# Patient Record
Sex: FEMALE | Race: AMERICAN INDIAN OR ALASKA NATIVE
[De-identification: names, ages, dates, MRNs, and addresses within clinical notes are randomized per-mention and may not be internally consistent; named-entity substitution may affect disease eponyms.]

---

## 2017-05-30 NOTE — EDM.PDOC
ED HPI GENERAL MEDICAL PROBLEM





- General


Chief Complaint: Abdominal Pain


Stated Complaint: SIDE/ABD PAINS, [id number]


Time Seen by Provider: 05/30/17 19:00


Source of Information: Reports: Patient


History Limitations: Reports: No Limitations





- History of Present Illness


INITIAL COMMENTS - FREE TEXT/NARRATIVE: 





c/o RUQ pain x 2 days, vomiting yesterday, none today, nauseated. No previous 

symptoms. Denies fever. 


Duration: Day(s):


Location: Reports: Abdomen


Quality: Reports: Throbbing


Severity: Moderate


Associated Symptoms: Reports: Nausea/Vomiting


  ** Right Upper Abdomen


Pain Score (Numeric/FACES): 0





- Related Data


 Allergies











Allergy/AdvReac Type Severity Reaction Status Date / Time


 


No Known Allergies Allergy   Verified 05/30/17 18:23











Home Meds: 


 Home Meds





Ferrous Sulfate [Iron] 325 mg PO BID 05/12/17 [History]


Prenatal Vit W-Ca,Fe,FA(<1 mg) [Prenatal Vitamins] 1 each PO DAILY 05/12/17 [

History]











Past Medical History





- Past Health History


Medical/Surgical History: Denies Medical/Surgical History





Social & Family History





- Family History


Family Medical History: Noncontributory





- Tobacco Use


Smoking Status *Q: Current Every Day Smoker


Years of Tobacco use: 2


Packs/Tins Daily: 0.4


Used Tobacco, but Quit: No





- Caffeine Use


Caffeine Use: Reports: Coffee, Soda





- Recreational Drug Use


Recreational Drug Use: No





ED ROS GENERAL





- Review of Systems


Review Of Systems: See Below


Constitutional: Reports: Decreased Appetite


HEENT: Reports: No Symptoms


Respiratory: Reports: No Symptoms


Cardiovascular: Reports: No Symptoms


GI/Abdominal: Reports: Abdominal Pain, Nausea, Vomiting


: Reports: No Symptoms, Other (s/p vaginal devilery 2 weeks ago.)


Musculoskeletal: Reports: No Symptoms


Skin: Reports: No Symptoms


Neurological: Reports: No Symptoms


Psychiatric: Reports: No Symptoms





ED EXAM, GI/ABD





- Physical Exam


Exam: See Below


Exam Limited By: No Limitations


General Appearance: Alert, Mild Distress


Eyes: Bilateral: Normal Appearance


Ears: Normal External Exam


Nose: Normal Inspection


Throat/Mouth: Normal Inspection


Head: Atraumatic, Normocephalic


Neck: Normal Inspection


Respiratory/Chest: No Respiratory Distress, Lungs Clear, Normal Breath Sounds


Cardiovascular: Normal Peripheral Pulses, Regular Rate, Rhythm


GI/Abdominal: Normal Bowel Sounds, Soft, No Distention, Tenderness, Mcgovern's 

Sign.  No: Guarding, Rebound, Rigidity


Extremities: Normal Inspection


Neurological: Alert, Oriented


Psychiatric: Normal Affect


Skin Exam: Warm, Dry, Intact





Course





- Vital Signs


Last Recorded V/S: 


 Last Vital Signs











Temp  97.3 F   05/30/17 21:49


 


Pulse  60   05/30/17 21:49


 


Resp  14   05/30/17 21:49


 


BP  125/58 L  05/30/17 21:49


 


Pulse Ox  100   05/30/17 21:49














- Orders/Labs/Meds


Orders: 


 Active Orders 24 hr











 Category Date Time Status


 


 CULTURE BLOOD [BC] Stat Lab  05/30/17 19:20 Received











Labs: 


 Laboratory Tests











  05/30/17 05/30/17 05/30/17 Range/Units





  19:11 19:20 19:20 


 


WBC   12.8 H   (5.0-10.0)  10^3/uL


 


RBC   4.54   (4.2-5.4)  10^6/uL


 


Hgb   12.6   (12.0-16.0)  g/dL


 


Hct   39.1   (37.0-47.0)  %


 


MCV   86.1   ()  fL


 


MCH   27.8   (27.0-34.0)  pg


 


MCHC   32.2 L   (33.0-35.0)  g/dL


 


Plt Count   336   (150-450)  10^3/uL


 


Neut % (Auto)   77.7 H   (42.2-75.2)  %


 


Lymph % (Auto)   15.7 L   (20.5-50.1)  %


 


Mono % (Auto)   6.0   (2-8)  %


 


Eos % (Auto)   0.5 L   (1.0-3.0)  %


 


Baso % (Auto)   0.1   (0.0-1.0)  %


 


Sodium    140  (135-145)  mmol/L


 


Potassium    3.8  (3.6-5.0)  mmol/L


 


Chloride    104  (101-111)  mmol/L


 


Carbon Dioxide    26.0  (21.0-31.0)  mmol/L


 


Anion Gap    13.8  


 


BUN    14  (7-18)  mg/dL


 


Creatinine    0.8  (0.6-1.3)  mg/dL


 


Est Cr Clr Drug Dosing    79.85  mL/min


 


Estimated GFR (MDRD)    > 60  


 


BUN/Creatinine Ratio    17.50  


 


Glucose    90  ()  mg/dL


 


Lactic Acid     (0.5-2.2)  mmol/L


 


Calcium    9.4  (8.4-10.2)  mg/dl


 


Total Bilirubin    1.6 H  (0.2-1.0)  mg/dL


 


AST    185 H  (10-42)  IU/L


 


ALT    179 H  (10-60)  IU/L


 


Alkaline Phosphatase    127 H  ()  IU/L


 


Total Protein    7.7  (6.7-8.2)  g/dl


 


Albumin    4.0  (3.2-5.5)  g/dl


 


Globulin    3.7  


 


Albumin/Globulin Ratio    1.08  


 


Amylase    175 H  ()  U/L


 


Lipase    286 H  (22-51)  U/L


 


Urine Color  Dark yellow    (YELLOW)  


 


Urine Appearance  Slightly cloudy    (CLEAR)  


 


Urine pH  5.5    (5.0-9.0)  


 


Ur Specific Gravity  1.025    (1.005-1.030)  


 


Urine Protein  30 H    (NEGATIVE)  


 


Urine Glucose (UA)  Negative    (NEGATIVE)  


 


Urine Ketones  Trace H    (NEGATIVE)  


 


Urine Occult Blood  Moderate H    (NEGATIVE)  


 


Urine Nitrite  Negative    (NEGATIVE)  


 


Urine Bilirubin  Moderate H    (NEGATIVE)  


 


Urine Urobilinogen  2.0 H    (0.2-1.0)  mg/dL


 


Ur Leukocyte Esterase  Trace H    (NEGATIVE)  


 


Urine RBC  20-30 H    /HPF


 


Urine WBC  0-5    (0-5/HPF)  /HPF


 


Ur Epithelial Cells  Few    /HPF


 


Urine Bacteria  Moderate H    (0-FEW/HPF)  /HPF


 


Urine Mucus  Moderate H    /LPF














  05/30/17 Range/Units





  19:20 


 


WBC   (5.0-10.0)  10^3/uL


 


RBC   (4.2-5.4)  10^6/uL


 


Hgb   (12.0-16.0)  g/dL


 


Hct   (37.0-47.0)  %


 


MCV   ()  fL


 


MCH   (27.0-34.0)  pg


 


MCHC   (33.0-35.0)  g/dL


 


Plt Count   (150-450)  10^3/uL


 


Neut % (Auto)   (42.2-75.2)  %


 


Lymph % (Auto)   (20.5-50.1)  %


 


Mono % (Auto)   (2-8)  %


 


Eos % (Auto)   (1.0-3.0)  %


 


Baso % (Auto)   (0.0-1.0)  %


 


Sodium   (135-145)  mmol/L


 


Potassium   (3.6-5.0)  mmol/L


 


Chloride   (101-111)  mmol/L


 


Carbon Dioxide   (21.0-31.0)  mmol/L


 


Anion Gap   


 


BUN   (7-18)  mg/dL


 


Creatinine   (0.6-1.3)  mg/dL


 


Est Cr Clr Drug Dosing   mL/min


 


Estimated GFR (MDRD)   


 


BUN/Creatinine Ratio   


 


Glucose   ()  mg/dL


 


Lactic Acid  0.9  (0.5-2.2)  mmol/L


 


Calcium   (8.4-10.2)  mg/dl


 


Total Bilirubin   (0.2-1.0)  mg/dL


 


AST   (10-42)  IU/L


 


ALT   (10-60)  IU/L


 


Alkaline Phosphatase   ()  IU/L


 


Total Protein   (6.7-8.2)  g/dl


 


Albumin   (3.2-5.5)  g/dl


 


Globulin   


 


Albumin/Globulin Ratio   


 


Amylase   ()  U/L


 


Lipase   (22-51)  U/L


 


Urine Color   (YELLOW)  


 


Urine Appearance   (CLEAR)  


 


Urine pH   (5.0-9.0)  


 


Ur Specific Gravity   (1.005-1.030)  


 


Urine Protein   (NEGATIVE)  


 


Urine Glucose (UA)   (NEGATIVE)  


 


Urine Ketones   (NEGATIVE)  


 


Urine Occult Blood   (NEGATIVE)  


 


Urine Nitrite   (NEGATIVE)  


 


Urine Bilirubin   (NEGATIVE)  


 


Urine Urobilinogen   (0.2-1.0)  mg/dL


 


Ur Leukocyte Esterase   (NEGATIVE)  


 


Urine RBC   /HPF


 


Urine WBC   (0-5/HPF)  /HPF


 


Ur Epithelial Cells   /HPF


 


Urine Bacteria   (0-FEW/HPF)  /HPF


 


Urine Mucus   /LPF











Meds: 


Medications














Discontinued Medications














Generic Name Dose Route Start Last Admin





  Trade Name Inderjitq  PRN Reason Stop Dose Admin


 


Hydrocodone Bitart/Acetaminophen  Confirm  05/30/17 21:30  05/30/17 21:38





  Norco 325-10 Mg  Administered  05/30/17 21:31  Not Given





  Dose   





  2 tab   





  .ROUTE   





  .STK-MED ONE   


 


Hydromorphone HCl  1 mg  05/30/17 19:07  05/30/17 19:24





  Dilaudid  IVPUSH  05/30/17 19:08  1 mg





  ONETIME ONE   Administration


 


Sodium Chloride  1,000 mls @ 500 mls/hr  05/30/17 19:07  05/30/17 19:22





  Normal Saline  IV  05/30/17 21:06  500 mls/hr





  .BOLUS ONE   Administration


 


Iopamidol  75 ml  05/30/17 19:56  05/30/17 20:24





  Isovue-300 (61%)  IVPUSH  05/30/17 19:57  75 ml





  ONETIME ONE   Administration


 


Metoclopramide HCl  10 mg  05/30/17 20:57  05/30/17 21:00





  Reglan  IVPUSH  05/30/17 20:58  10 mg





  ONETIME ONE   Administration


 


Ondansetron HCl  4 mg  05/30/17 19:07  05/30/17 19:24





  Zofran  IV  05/30/17 19:08  4 mg





  ONETIME ONE   Administration


 


Ondansetron HCl  Confirm  05/30/17 21:30  05/30/17 21:38





  Zofran Odt  Administered  05/30/17 21:31  Not Given





  Dose   





  12 mg   





  .ROUTE   





  [facility]   














- Radiology Interpretation


Free Text/Narrative:: 





Cholecystitis - gall badder wall thickening no ductal dilatation











- Re-Assessments/Exams


Free Text/Narrative Re-Assessment/Exam: 





05/31/17 02:05


Pain improved following dilaudid. Patient and mother informed of CT results.  

Patient to follow with PCP this week. 





Departure





- Departure


Time of Disposition: 21:36


Disposition: Home, Self-Care 01


Condition: good


Clinical Impression: 


 Cholecystitis








- Discharge Information


Instructions:  Cholecystitis, Easy-to-Read


Forms:  ED Department Discharge


Additional Instructions: 


low fat low acid diet


follow up in clinic this week, sooner if develop fever or chills, worsening of 

pain


zofran 4mg ODT one every 6 hours as needed for nausea 


Hydrocodone 10/325 one every 6 hours as needed for pain #2








- My Orders


Last 24 Hours: 


My Active Orders





05/30/17 19:20


CULTURE BLOOD [BC] Stat 














- Assessment/Plan


Last 24 Hours: 


My Active Orders





05/30/17 19:20


CULTURE BLOOD [BC] Stat

## 2021-08-13 ENCOUNTER — HOSPITAL ENCOUNTER (OUTPATIENT)
Dept: HOSPITAL 43 - DL.SDS | Age: 31
Discharge: HOME | End: 2021-08-13
Attending: FAMILY MEDICINE
Payer: MEDICAID

## 2021-08-13 VITALS — DIASTOLIC BLOOD PRESSURE: 64 MMHG | HEART RATE: 60 BPM | SYSTOLIC BLOOD PRESSURE: 104 MMHG

## 2021-08-13 DIAGNOSIS — D50.9: ICD-10-CM

## 2021-08-13 DIAGNOSIS — O02.1: Primary | ICD-10-CM

## 2021-08-13 NOTE — OR
DATE:  2021

 

PROCEDURE PERFORMED:  Suction dilatation and curettage.

 

INDICATION FOR PROCEDURE:  Missed  with retained products of conception.

 

ANESTHESIA:  MAC with local.

 

BRIEF HISTORY:  30-year-old  2, para 1-0-1-1 with an ultrasound showing a

fetal pole and sac consistent with 10-week 3-day gestation.  The patient should

be 13 weeks' gestation today.  Denying active bleeding or cramping.  In the

office, we have discussed options for expectant medical or surgical management

and she has elected surgical management.  Reports that she is in her usual state

of health at this time.  Vomiting has been better and is controlled with

medications.  Denies signs or symptoms that she is starting to pass the tissue

yet.

 

CONSENT: Discussed indication for removal of contents of the uterus for

emotional closure.  Also, decreased risk of infection and bleeding.  Discussed

risk of bleeding to the point of requiring a blood transfusion, risk of

potential post or perioperative infection, risk of injury to internal organs and

adjacent structures including, but not limited to, bladder; uterus; intestines;

vagina; cervix; and how those would be managed.  Her questions were answered and

she agreed to proceed.  Appropriate consent forms were signed and in the chart.

 

DETAILS:  The patient brought to the operating room.  She had doxycycline 200 mg

p.o. 30 minutes prior to start time.  She was provided with MAC anesthesia and

placed in dorsal lithotomy position.  Vagina prepped and straight catheter

performed with return of 10 mL of clear fluid.  The patient had voided just

prior to coming to the OR.  Bimanual exam consistent with a 10-week uterine size

in slightly anteverted position.  Sterile speculum was then placed, 1 mL of 0.5%

lidocaine with epinephrine was used in the anterior lip for placement of

the single-tooth tenaculum. Then injected at the 4 and 8 o'clock

positions 4.5 to 5 mL each for the paracervical block.  Appropriate wheal was

created with speculum in place.  Uterus was sounded to 10.5 mL, and Hegar

dilators used up into the 20 size without difficulty.  Suction catheter then

placed and with typical suction technique with rotation and sweeps and scrapes

along the uterus.  Contents of the uterus aspirated without incident.  Uterus

was then cleared with sharp curette and the uterine cri was well felt.  Final

pass of the suction catheter was made  it was felt that all contents had been

removed at that time.  Bedside ultrasound was performed by myself and poor

quality image.  However, it was felt that the uterus was clear.  I inspected the

tissue received.  I initially did not see the baby, so repeated ultrasound with

some changes in settings and uterus still appeared empty.  Bimanual exam

revealed the uterus to be back down to about a 6-week size, well contracted.

Contents inspected again, and at this time I was finding fetal parts in the 
aspiration

contents.

 

ESTIMATED BLOOD LOSS:  200 mL.

 

FINDINGS:  Gross inspection of the products of conception revealed placental

tissue, gestational sac, and fetal parts consistent with 10-week gestation.

Pathology was not requested.

 

COMPLICATIONS:  None.

 

MEDICATIONS:  The patient had an IV running with potassium chloride.  She was

also given a dose of  10 units IM Pitocin and 800 mcg of rectal Cytotec.

Bleeding was well controlled at the end of the procedure.

 

DISPOSITION:  The patient taken to the PACU for further recovery and her

boyfriend's mother informed of the outcome of the procedure.

 

DD:  2021 11:41:33

DT:  2021 12:47:47

[facility]

Job #:  [id number]

KRISTI

## 2022-01-05 ENCOUNTER — HOSPITAL ENCOUNTER (EMERGENCY)
Dept: HOSPITAL 43 - DL.ED | Age: 32
Discharge: HOME | End: 2022-01-05
Payer: MEDICAID

## 2022-01-05 VITALS — SYSTOLIC BLOOD PRESSURE: 121 MMHG | HEART RATE: 120 BPM | DIASTOLIC BLOOD PRESSURE: 80 MMHG

## 2022-01-05 DIAGNOSIS — Z20.822: ICD-10-CM

## 2022-01-05 DIAGNOSIS — Z91.048: ICD-10-CM

## 2022-01-05 DIAGNOSIS — N18.9: ICD-10-CM

## 2022-01-05 DIAGNOSIS — J10.1: Primary | ICD-10-CM

## 2022-01-05 DIAGNOSIS — Z72.0: ICD-10-CM

## 2022-01-05 LAB — SARS-COV-2 RNA RESP QL NAA+PROBE: NEGATIVE

## 2022-01-05 PROCEDURE — 0240U: CPT

## 2022-01-05 PROCEDURE — 99283 EMERGENCY DEPT VISIT LOW MDM: CPT

## 2022-01-05 NOTE — EDM.PDOC
ED HPI GENERAL MEDICAL PROBLEM





- General


Chief Complaint: ENT Problem


Stated Complaint: EAR ACHE, HEAD ACHE, CHEST, COUGH


Time Seen by Provider: 22 21:05


Source of Information: Reports: Patient


History Limitations: Reports: No Limitations





- History of Present Illness


INITIAL COMMENTS - FREE TEXT/NARRATIVE: 





This 32 yo female patient reports to the ED with head congestion, body aches and

fever for the past 2 days. The patient reports she did get the flu shot this 

year. The patient reports she has not taken any Tylenol or ibuprofen today for 

her symptoms. 


Duration: Day(s): (2), Constant, Getting Worse


Location: Reports: Generalized


Quality: Reports: Ache


Severity: Moderate


Improves with: Reports: None


Worsens with: Reports: None


Context: Reports: Other


Treatments PTA: Reports: NSAIDS, Other Medication(s)


  ** Headache


Pain Score (Numeric/FACES): 10





- Related Data


                                    Allergies











Allergy/AdvReac Type Severity Reaction Status Date / Time


 


seasonal Allergy  Sneezing Uncoded 22 20:33











Home Meds: 


                                    Home Meds





Otc Ibuprofen  22 [History]


Otc Robitussin  22 [History]











Past Medical History





- Past Health History


Medical/Surgical History: Denies Medical/Surgical History


HEENT History: Reports: None


Cardiovascular History: Reports: None


Respiratory History: Reports: None


Gastrointestinal History: Reports: Chronic Diarrhea


Genitourinary History: Reports: Chronic Renal Insuffiency


OB/GYN History: Reports: Pregnancy, Spontaneous 


Musculoskeletal History: Reports: None


Neurological History: Reports: None


Psychiatric History: Reports: None


Endocrine/Metabolic History: Reports: None


Hematologic History: Reports: Iron Deficiency


Immunologic History: Reports: None


Oncologic (Cancer) History: Reports: None


Dermatologic History: Reports: None





- Infectious Disease History


Infectious Disease History: Reports: None





- Past Surgical History


Head Surgeries/Procedures: Reports: None


HEENT Surgical History: Reports: Myringotomy w Tube(s)


Cardiovascular Surgical History: Reports: None


Respiratory Surgical History: Reports: None


GI Surgical History: Reports: Cholecystectomy


Female  Surgical History: Reports: None


Endocrine Surgical History: Reports: None


Neurological Surgical History: Reports: None


Musculoskeletal Surgical History: Reports: None





Social & Family History





- Family History


Family Medical History: No Pertinent Family History





- Tobacco Use


Tobacco Use Status *Q: Current Every Day Tobacco User


Years of Tobacco use: 7


Packs/Tins Daily: 0.1





- Caffeine Use


Caffeine Use: Reports: Coffee





- Recreational Drug Use


Recreational Drug Use: No





ED ROS GENERAL





- Review of Systems


Review Of Systems: Comprehensive ROS is negative, except as noted in HPI.





ED EXAM, GENERAL





- Physical Exam


Exam: See Below


Exam Limited By: No Limitations


General Appearance: Alert, WD/WN, Moderate Distress


Eye Exam: Bilateral Eye: EOMI, Normal Inspection, PERRL


Ears: Normal External Exam, Normal Canal, Hearing Grossly Normal, Normal TMs


Nose: Normal Inspection, Normal Mucosa, No Blood


Throat/Mouth: Normal Inspection, Normal Lips, Normal Teeth, Normal Gums, Normal 

Oropharynx, Normal Voice, No Airway Compromise


Head: Atraumatic, Normocephalic


Neck: Normal Inspection, Supple, Non-Tender, Full Range of Motion


Respiratory/Chest: No Respiratory Distress, Lungs Clear, Normal Breath Sounds, 

No Accessory Muscle Use, Chest Non-Tender


Cardiovascular: Normal Peripheral Pulses, Regular Rate, Rhythm, No Edema, No 

Gallop, No JVD, No Murmur, No Rub


GI/Abdominal: Normal Bowel Sounds, Soft, Non-Tender, No Organomegaly, No 

Distention, No Abnormal Bruit, No Mass


 (Female) Exam: Deferred


Rectal (Female) Exam: Deferred


Back Exam: Normal Inspection, Full Range of Motion, NT


Extremities: Normal Inspection, Normal Range of Motion, Non-Tender, Normal 

Capillary Refill, No Pedal Edema


Neurological: Alert, Oriented, CN II-XII Intact, Normal Cognition, Normal Gait, 

Normal Reflexes, No Motor/Sensory Deficits


Psychiatric: Normal Affect, Normal Mood


Skin Exam: Dry, Intact, Normal Color, No Rash, Increased Warmth


Lymphatic: No Adenopathy





Course





- Vital Signs


Last Recorded V/S: 





                                Last Vital Signs











Temp  99.6 F   22 20:06


 


Pulse  120 H  22 20:06


 


Resp  16   22 20:06


 


BP  121/80   22 20:06


 


Pulse Ox  98   22 20:06














- Orders/Labs/Meds


Orders: 





                               Active Orders 24 hr











 Category Date Time Status


 


 Sodium Chloride 0.9% [Normal Saline] 1,000 ml Med  22 21:08 Ordered





 IV .BOLUS   








                                Medication Orders





Sodium Chloride (Normal Saline)  1,000 mls @ 999 mls/hr IV .BOLUS ONE


   Stop: 01/05/22 22:08








Labs: 





                                Laboratory Tests











  22 Range/Units





  19:57 


 


Influenza Type A RNA  Positive H  (NEGATIVE)  


 


Influenza Type B RNA  Negative  (NEGATIVE)  


 


SARS-CoV-2 RNA ([place])  Negative  (NEGATIVE)  











Meds: 





Medications











Generic Name Dose Route Start Last Admin





  Trade Name Yumiko  PRN Reason Stop Dose Admin


 


Sodium Chloride  1,000 mls @ 999 mls/hr  22 21:08 





  Normal Saline  IV  22 22:08 





  .BOLUS ONE  














Discontinued Medications














Generic Name Dose Route Start Last Admin





  Trade Name Yumiko  PRN Reason Stop Dose Admin


 


Acetaminophen  650 mg  22 21:10 





  Acetaminophen 325 Mg Tab  PO  22 21:11 





  NOW ONE  


 


Oseltamivir Phosphate  75 mg  22 21:08 





  Oseltamivir 75 Mg Cap  PO  22 21:09 





  ONETIME ONE  














Departure





- Departure


Time of Disposition: 21:21


Disposition: Home, Self-Care 01


Condition: Fair


Clinical Impression: 


 Influenza A








- Discharge Information


*PRESCRIPTION DRUG MONITORING PROGRAM REVIEWED*: Not Applicable


*COPY OF PRESCRIPTION DRUG MONITORING REPORT IN PATIENT KRISTA: Not Applicable


Instructions:  Influenza, Adult, Easy-to-Read


Care Plan Goals: 


The patient was advised of the examination and lab results during the visit. The

patient was given IV fluids, Tylenol and Tamiflu while in the ED. The patient 

was discharged with a script for Tamiflu (75 mg) #9 to take 1 by mouth 2 times 

per day until gone. The patient was encouraged to increase her oral fluid 

intake. The patient may take Tylenol or ibuprofen as directed for temporary 

symptom relief. The patient was encouraged to stick to a BRAT diet (bananas, 

rice, applesauce and toast) with small frequent sips of fluid. If the patient 

has any additional symptoms or concerns, the patient should follow-up with her 

primary care facility or return to the emergency department.





Sepsis Event Note (ED)





- Evaluation


Sepsis Screening Result: No Definite Risk





- Focused Exam


Vital Signs: 





                                   Vital Signs











  Temp Pulse Resp BP Pulse Ox


 


 22 20:06  99.6 F  120 H  16  121/80  98














- My Orders


Last 24 Hours: 





My Active Orders





22 21:08


Sodium Chloride 0.9% [Normal Saline] 1,000 ml IV .BOLUS 














- Assessment/Plan


Last 24 Hours: 





My Active Orders





22 21:08


Sodium Chloride 0.9% [Normal Saline] 1,000 ml IV .BOLUS

## 2022-05-22 ENCOUNTER — HOSPITAL ENCOUNTER (EMERGENCY)
Dept: HOSPITAL 43 - DL.ED | Age: 32
Discharge: HOME | End: 2022-05-22
Payer: MEDICAID

## 2022-05-22 VITALS — HEART RATE: 110 BPM | SYSTOLIC BLOOD PRESSURE: 137 MMHG | DIASTOLIC BLOOD PRESSURE: 87 MMHG

## 2022-05-22 DIAGNOSIS — Z87.891: ICD-10-CM

## 2022-05-22 DIAGNOSIS — Z90.49: ICD-10-CM

## 2022-05-22 DIAGNOSIS — O21.9: Primary | ICD-10-CM

## 2022-05-22 DIAGNOSIS — Z79.899: ICD-10-CM

## 2022-05-22 DIAGNOSIS — O23.10: ICD-10-CM

## 2022-05-22 DIAGNOSIS — Z3A.00: ICD-10-CM

## 2022-05-22 DIAGNOSIS — Z91.09: ICD-10-CM

## 2022-05-22 LAB
AMPHET UR QL SCN: NEGATIVE
AMPHET UR QL SCN: NEGATIVE
AMPHETAMINES UR QL SCN>500 NG/ML: NEGATIVE
ANION GAP SERPL CALC-SCNC: 17.6 MEQ/L (ref 7–13)
BARBITURATES UR QL SCN: NEGATIVE
CHLORIDE SERPL-SCNC: 101 MMOL/L (ref 98–107)
MDMA UR QL SCN: NEGATIVE
OXYCODONE UR QL SCN: NEGATIVE
PCP UR QL SCN: NEGATIVE
SODIUM SERPL-SCNC: 135 MMOL/L (ref 136–145)
SP GR UR STRIP: >= 1.03 (ref 1–1.03)
TRICYCLICS UR QL SCN: NEGATIVE

## 2022-05-22 PROCEDURE — 84443 ASSAY THYROID STIM HORMONE: CPT

## 2022-05-22 PROCEDURE — 80307 DRUG TEST PRSMV CHEM ANLYZR: CPT

## 2022-05-22 PROCEDURE — 80053 COMPREHEN METABOLIC PANEL: CPT

## 2022-05-22 PROCEDURE — 81001 URINALYSIS AUTO W/SCOPE: CPT

## 2022-05-22 PROCEDURE — 99283 EMERGENCY DEPT VISIT LOW MDM: CPT

## 2022-05-22 PROCEDURE — 87086 URINE CULTURE/COLONY COUNT: CPT

## 2022-05-22 PROCEDURE — 85025 COMPLETE CBC W/AUTO DIFF WBC: CPT

## 2022-05-22 PROCEDURE — 96374 THER/PROPH/DIAG INJ IV PUSH: CPT

## 2022-05-22 PROCEDURE — 99284 EMERGENCY DEPT VISIT MOD MDM: CPT

## 2022-05-22 PROCEDURE — 36415 COLL VENOUS BLD VENIPUNCTURE: CPT

## 2022-05-22 PROCEDURE — 84702 CHORIONIC GONADOTROPIN TEST: CPT

## 2022-05-22 PROCEDURE — 80305 DRUG TEST PRSMV DIR OPT OBS: CPT

## 2022-11-19 ENCOUNTER — HOSPITAL ENCOUNTER (INPATIENT)
Dept: HOSPITAL 43 - DL.OBCHECK | Age: 32
LOS: 3 days | Discharge: HOME | End: 2022-11-22
Attending: FAMILY MEDICINE | Admitting: FAMILY MEDICINE
Payer: MEDICAID

## 2022-11-19 DIAGNOSIS — D64.9: ICD-10-CM

## 2022-11-19 DIAGNOSIS — Z20.822: ICD-10-CM

## 2022-11-19 DIAGNOSIS — Z3A.37: ICD-10-CM

## 2022-11-19 PROCEDURE — U0002 COVID-19 LAB TEST NON-CDC: HCPCS

## 2022-11-20 PROCEDURE — 4A1HXCZ MONITORING OF PRODUCTS OF CONCEPTION, CARDIAC RATE, EXTERNAL APPROACH: ICD-10-PCS | Performed by: FAMILY MEDICINE

## 2022-11-20 PROCEDURE — 10907ZC DRAINAGE OF AMNIOTIC FLUID, THERAPEUTIC FROM PRODUCTS OF CONCEPTION, VIA NATURAL OR ARTIFICIAL OPENING: ICD-10-PCS | Performed by: FAMILY MEDICINE

## 2022-11-20 RX ADMIN — VITAMIN A, ASCORBIC ACID, CHOLECALCIFEROL, .ALPHA.-TOCOPHEROL ACETATE, DL-, THIAMINE MONONITRATE, RIBOFLAVIN, NIACINAMIDE, PYRIDOXINE HYDROCHLORIDE, FOLIC ACID, CYANOCOBALAMIN, CALCIUM CARBONATE, IRON, ZINC OXIDE, AND CUPRIC OXIDE SCH EACH: 4000; 120; 400; 22; 1.84; 3; 20; 10; 1; 12; 200; 29; 25; 2 TABLET ORAL at 08:25

## 2022-11-21 RX ADMIN — VITAMIN A, ASCORBIC ACID, CHOLECALCIFEROL, .ALPHA.-TOCOPHEROL ACETATE, DL-, THIAMINE MONONITRATE, RIBOFLAVIN, NIACINAMIDE, PYRIDOXINE HYDROCHLORIDE, FOLIC ACID, CYANOCOBALAMIN, CALCIUM CARBONATE, IRON, ZINC OXIDE, AND CUPRIC OXIDE SCH EACH: 4000; 120; 400; 22; 1.84; 3; 20; 10; 1; 12; 200; 29; 25; 2 TABLET ORAL at 08:12

## 2022-11-22 VITALS — HEART RATE: 93 BPM | SYSTOLIC BLOOD PRESSURE: 136 MMHG | DIASTOLIC BLOOD PRESSURE: 73 MMHG

## 2022-11-22 RX ADMIN — VITAMIN A, ASCORBIC ACID, CHOLECALCIFEROL, .ALPHA.-TOCOPHEROL ACETATE, DL-, THIAMINE MONONITRATE, RIBOFLAVIN, NIACINAMIDE, PYRIDOXINE HYDROCHLORIDE, FOLIC ACID, CYANOCOBALAMIN, CALCIUM CARBONATE, IRON, ZINC OXIDE, AND CUPRIC OXIDE SCH EACH: 4000; 120; 400; 22; 1.84; 3; 20; 10; 1; 12; 200; 29; 25; 2 TABLET ORAL at 08:11
